# Patient Record
Sex: MALE | Race: WHITE | Employment: FULL TIME | URBAN - METROPOLITAN AREA
[De-identification: names, ages, dates, MRNs, and addresses within clinical notes are randomized per-mention and may not be internally consistent; named-entity substitution may affect disease eponyms.]

---

## 2017-10-05 ENCOUNTER — HOSPITAL ENCOUNTER (EMERGENCY)
Age: 22
Discharge: HOME | End: 2017-10-05
Attending: EMERGENCY MEDICINE
Payer: COMMERCIAL

## 2017-10-05 VITALS
DIASTOLIC BLOOD PRESSURE: 74 MMHG | SYSTOLIC BLOOD PRESSURE: 129 MMHG | HEART RATE: 82 BPM | WEIGHT: 190.26 LBS | OXYGEN SATURATION: 96 % | RESPIRATION RATE: 18 BRPM | TEMPERATURE: 98.1 F

## 2017-10-05 DIAGNOSIS — R51.9 FACIAL PAIN: Primary | ICD-10-CM

## 2017-10-05 DIAGNOSIS — K08.89 PAIN, DENTAL: ICD-10-CM

## 2017-10-05 PROCEDURE — 74011000250 HC RX REV CODE- 250: Performed by: EMERGENCY MEDICINE

## 2017-10-05 PROCEDURE — 74011250637 HC RX REV CODE- 250/637: Performed by: EMERGENCY MEDICINE

## 2017-10-05 PROCEDURE — 99283 EMERGENCY DEPT VISIT LOW MDM: CPT

## 2017-10-05 RX ORDER — IBUPROFEN 400 MG/1
800 TABLET ORAL
Status: COMPLETED | OUTPATIENT
Start: 2017-10-05 | End: 2017-10-05

## 2017-10-05 RX ORDER — PENICILLIN V POTASSIUM 500 MG/1
500 TABLET, FILM COATED ORAL 4 TIMES DAILY
Qty: 40 TAB | Refills: 0 | Status: SHIPPED | OUTPATIENT
Start: 2017-10-05 | End: 2017-10-15

## 2017-10-05 RX ORDER — IBUPROFEN 800 MG/1
800 TABLET ORAL
Qty: 20 TAB | Refills: 0 | Status: SHIPPED | OUTPATIENT
Start: 2017-10-05 | End: 2017-10-12

## 2017-10-05 RX ORDER — PENICILLIN V POTASSIUM 250 MG/1
500 TABLET, FILM COATED ORAL
Status: COMPLETED | OUTPATIENT
Start: 2017-10-05 | End: 2017-10-05

## 2017-10-05 RX ADMIN — IBUPROFEN 800 MG: 400 TABLET, FILM COATED ORAL at 02:05

## 2017-10-05 RX ADMIN — LIDOCAINE HYDROCHLORIDE: 20 SOLUTION ORAL; TOPICAL at 02:04

## 2017-10-05 RX ADMIN — PENICILLIN V POTASSIUM 500 MG: 250 TABLET ORAL at 02:05

## 2017-10-05 NOTE — ED NOTES
Patient education given on medication administration and the patient expresses understanding and acceptance of instructions. Nikole Malik RN 10/5/2017 2:17 AM      MIGDALIA MEDEIROS gave and reviewed discharge instructions with the parent. The parent verbalized understanding. The parent was given opportunity for questions. Patient discharged in stable condition to the waiting room via ambulation.

## 2017-10-05 NOTE — ED PROVIDER NOTES
HPI Comments: 23 yo male here with 1.5 weeks of right upper tooth pain. Pt states he had a fracture of a tooth which was filled and has had pain since then. Has seen Dr. Wynona Blizzard in [place] for it. 9/10 severe right upper tooth pain. It was shaved down 2 days ago. Pain much worse tonight. Pt was at the hospital visiting someone and didn't have any ibuprofen. Denies fever, difficulty breathing or swallowing. He thinks he might have an infection. SHX:  + smoker. No etoh. The history is provided by the patient. History reviewed. No pertinent past medical history. History reviewed. No pertinent surgical history. History reviewed. No pertinent family history. Social History     Social History    Marital status: SINGLE     Spouse name: N/A    Number of children: N/A    Years of education: N/A     Occupational History    Not on file. Social History Main Topics    Smoking status: Current Every Day Smoker     Packs/day: 1.00    Smokeless tobacco: Never Used    Alcohol use No    Drug use: Not on file    Sexual activity: Not on file     Other Topics Concern    Not on file     Social History Narrative    No narrative on file         ALLERGIES: Review of patient's allergies indicates no known allergies. Review of Systems   Constitutional: Negative for fever. HENT: Positive for dental problem. Negative for facial swelling, trouble swallowing and voice change. All other systems reviewed and are negative. Vitals:    10/05/17 0104   BP: 129/74   Pulse: 82   Resp: 18   Temp: 98.1 °F (36.7 °C)   SpO2: 96%   Weight: 86.3 kg (190 lb 4.1 oz)            Physical Exam   Constitutional: He is oriented to person, place, and time. He appears well-developed and well-nourished. HENT:   Head: Normocephalic and atraumatic. Mouth/Throat: Oropharynx is clear and moist. No oropharyngeal exudate. Filling right upper molar. No trismus, drooling, facial swelling, gum swelling. Normal voice   Eyes: Conjunctivae are normal. Right eye exhibits no discharge. Left eye exhibits no discharge. No scleral icterus. Neck: Normal range of motion. Neck supple. Cardiovascular: Normal rate, regular rhythm and normal heart sounds. Exam reveals no gallop and no friction rub. No murmur heard. Pulmonary/Chest: Effort normal and breath sounds normal. No respiratory distress. He has no wheezes. He has no rales. Abdominal: Soft. Bowel sounds are normal. He exhibits no distension. There is no tenderness. There is no guarding. Musculoskeletal: Normal range of motion. He exhibits no edema or tenderness. Lymphadenopathy:     He has no cervical adenopathy. Neurological: He is alert and oriented to person, place, and time. No cranial nerve deficit. Coordination normal.   Skin: Skin is warm and dry. No rash noted. No pallor. Nursing note and vitals reviewed. [facility]  ED Course   dental pain - dental ball, pcn, motrin. Needs f/u with dentist.      Procedures    1:48 AM  Child has been re-examined and appears well. Child is active, interactive and appears well hydrated. Laboratory tests, medications, x-rays, diagnosis, follow up plan and return instructions have been reviewed and discussed with the family. Family has had the opportunity to ask questions about their child's care. Family expresses understanding and agreement with care plan, follow up and return instructions. Family agrees to return the child to the ER if their symptoms are not improving or immediately if they have any change in their condition. Family understands to follow up with their pediatrician or other physician as instructed to ensure resolution of the issue seen for today.

## 2017-10-05 NOTE — DISCHARGE INSTRUCTIONS
Dental Pain: After Your Visit  Your Care Instructions  The most common cause of dental pain is tooth decay. It can also be caused by an infection of the tooth (abscess) or gum, a tooth that has not broken all the way through the gum (impacted tooth), or a problem with the nerve-filled center of the tooth. Follow-up care is a key part of your treatment and safety. Be sure to make and go to all appointments, and call your doctor if you are having problems. Its also a good idea to know your test results and keep a list of the medicines you take. How can you care for yourself at home? · Contact a dentist for follow-up care. · Put ice or a cold pack on the outside of your mouth for 10 to 20 minutes at a time to reduce pain and swelling. Put a thin cloth between the ice and your skin. · Take an over-the-counter pain medicine, such as acetaminophen (Tylenol), ibuprofen (Advil, Motrin), or naproxen (Aleve). Read and follow all instructions on the label. · Do not take two or more pain medicines at the same time unless the doctor told you to. Many pain medicines have acetaminophen, which is Tylenol. Too much acetaminophen (Tylenol) can be harmful. · Rinse your mouth with warm salt water every 2 hours to help relieve pain and swelling from an infected tooth. Mix 1 teaspoon of salt in 8 ounces of water. · If your doctor prescribed antibiotics, take them as directed. Do not stop taking them just because you feel better. You need to take the full course of antibiotics. When should you call for help? Call your doctor now or seek immediate medical care if:  · You have signs of infection, such as:  ¨ Increased pain, swelling, warmth, or redness. ¨ Pus draining from the gum, tooth, or face. ¨ A fever. Watch closely for changes in your health, and be sure to contact your doctor if:  · You do not get better as expected. Where can you learn more?    Go to [employer]  Enter D164 in the search box to learn more about \"Dental Pain: After Your Visit. \"   © 6247-6177 [employer]. Care instructions adapted under license by [employer] (which disclaims liability or warranty for this information). This care instruction is for use with your licensed healthcare professional. If you have questions about a medical condition or this instruction, always ask your healthcare professional. [address] any warranty or liability for your use of this information. Content Version: 57.0.336357;  Last Revised: May 17, 2013

## 2017-10-05 NOTE — ED TRIAGE NOTES
TRIAGE: patient reports increased R sided jaw pain since having 'dental work' performed 1.5 weeks ago. Patient reports revisiting dentist and having \"the crown shaved down' however pain today is intolerable. Patient reports taking prescription vicodin 2 days ago and is out of his prescription at this time. Patient denies taking any medications for pain PTA.